# Patient Record
Sex: FEMALE | Race: WHITE | NOT HISPANIC OR LATINO | Employment: UNEMPLOYED | URBAN - METROPOLITAN AREA
[De-identification: names, ages, dates, MRNs, and addresses within clinical notes are randomized per-mention and may not be internally consistent; named-entity substitution may affect disease eponyms.]

---

## 2023-12-05 ENCOUNTER — OFFICE VISIT (OUTPATIENT)
Dept: NEUROSURGERY | Facility: CLINIC | Age: 43
End: 2023-12-05
Payer: COMMERCIAL

## 2023-12-05 VITALS
BODY MASS INDEX: 27.37 KG/M2 | HEART RATE: 79 BPM | WEIGHT: 184.8 LBS | DIASTOLIC BLOOD PRESSURE: 83 MMHG | SYSTOLIC BLOOD PRESSURE: 123 MMHG | HEIGHT: 69 IN

## 2023-12-05 DIAGNOSIS — M89.9 LESION OF LUMBAR SPINE: ICD-10-CM

## 2023-12-05 DIAGNOSIS — M47.817 SPONDYLOSIS OF LUMBOSACRAL REGION WITHOUT MYELOPATHY OR RADICULOPATHY: Primary | ICD-10-CM

## 2023-12-05 DIAGNOSIS — M47.816 FACET ARTHRITIS OF LUMBAR REGION: ICD-10-CM

## 2023-12-05 NOTE — PROGRESS NOTES
"Chief Complaint  Back Pain (Low back pain on right side.  Pt has tried PT would results)    Subjective          Anila Ward who is a 43 y.o. year old female who presents to [facility] for evaluation of lumbar spine.      The patient complains of pain located in the lumbar spine.  Patients states the pain has been present for several years.  The pain came on gradually.  Pain has progressed over the past 6 months. The pain scale level is 6 today. Will increase to a 9/10 when most severe.  The pain does not radiate. .  The pain is waxing/waning and described as aching and throbbing.  The pain is worse at no particular time of day. Patient states prolonged sitting and driving, repetitive bending and twisting makes the pain worse.  Patient states  repositioning  makes the pain better.    Associated Symptoms Include: Denies numbness and tingling  Conservative Interventions Include: Physical Therapy that was not very effective. She will occasionally take Motrin though not often. She has never been to pain management.    Was this the result of an injury or accident? : No    History of Previous Spinal Surgery?: No    Nicotine use: non-smoker    BMI: Body mass index is 27.29 kg/m².      Review of Systems   Musculoskeletal:  Positive for arthralgias, back pain and myalgias.   All other systems reviewed and are negative.       Objective   Vital Signs:   /83 (BP Location: Left arm)   Pulse 79   Ht 175.3 cm (69\")   Wt 83.8 kg (184 lb 12.8 oz)   BMI 27.29 kg/m²       Physical Exam  Vitals reviewed.   Constitutional:       Appearance: Normal appearance.   Musculoskeletal:      Lumbar back: Tenderness present. Negative right straight leg raise test and negative left straight leg raise test.      Right hip: No tenderness. Normal range of motion.      Left hip: No tenderness. Normal range of motion.   Neurological:      Mental Status: She is alert and oriented to person, place, " and time.      Motor: Motor strength is normal.     Gait: Gait is intact.      Deep Tendon Reflexes:      Reflex Scores:       Patellar reflexes are 2+ on the right side and 2+ on the left side.       Achilles reflexes are 2+ on the right side and 2+ on the left side.       Neurologic Exam     Mental Status   Oriented to person, place, and time.   Level of consciousness: alert    Motor Exam   Muscle bulk: normal  Overall muscle tone: normal    Strength   Strength 5/5 throughout.     Sensory Exam   Light touch normal.     Gait, Coordination, and Reflexes     Gait  Gait: normal    Reflexes   Right patellar: 2+  Left patellar: 2+  Right achilles: 2+  Left achilles: 2+  Right ankle clonus: absent  Left ankle clonus: absent       Result Review :       Data reviewed : Radiologic studies MRI Lumbar Spine on 10/31/23 at  personally reviewed. Modic type I endplate changes at L4/5 with a right sided disc osteophyte complex, resulting in mild spinal canal and moderate right foraminal stenosis. At L2/3 there is a 5x3 mm nodular focus slightly displacing the intrathecal neural structures, visible only on sagittal imaging. Could relate to a small meningioma or schwannoma of the nerve root sleeve. Contrasted imaging is recommended for further evaluation.           Assessment and Plan    Diagnoses and all orders for this visit:    1. Spondylosis of lumbosacral region without myelopathy or radiculopathy (Primary)  -     Ambulatory Referral to Pain Management Clinic    2. Facet arthritis of lumbar region  -     Ambulatory Referral to Pain Management Clinic    3. Lesion of lumbar spine  -     MRI Lumbar Spine With & Without Contrast; Future    Pt presenting for evaluation of back pain. We reviewed her MRI Lumbar Spine, demonstrating spondylosis with modic endplate changes at L4/5. Her pain is primarily in the back. We discussed that surgery would likely not provide improvement in back pain. She has failed physical therapy. Will  refer to pain management for lumbar RFA trial.     There was an incidental spinal lesion found at L3/4. Will order MRI Lumbar w/wo contrast to rule out metastatic process.     Follow up after MRI.      Follow Up   Return in about 6 weeks (around 1/16/2024).  Patient was given instructions and counseling regarding her condition or for health maintenance advice.     -MRI Lumbar Spine w/wo  -Follow up after MRI

## 2024-01-04 DIAGNOSIS — M89.9 LESION OF LUMBAR SPINE: ICD-10-CM

## 2024-01-09 ENCOUNTER — TELEPHONE (OUTPATIENT)
Dept: NEUROSURGERY | Facility: CLINIC | Age: 44
End: 2024-01-09
Payer: COMMERCIAL

## 2024-02-15 ENCOUNTER — OFFICE VISIT (OUTPATIENT)
Dept: NEUROSURGERY | Facility: CLINIC | Age: 44
End: 2024-02-15
Payer: COMMERCIAL

## 2024-02-15 VITALS
SYSTOLIC BLOOD PRESSURE: 124 MMHG | BODY MASS INDEX: 27.73 KG/M2 | WEIGHT: 187.2 LBS | HEIGHT: 69 IN | DIASTOLIC BLOOD PRESSURE: 76 MMHG

## 2024-02-15 DIAGNOSIS — M51.36 DDD (DEGENERATIVE DISC DISEASE), LUMBAR: Primary | ICD-10-CM

## 2024-02-15 DIAGNOSIS — D49.7 INTRADURAL EXTRAMEDULLARY SPINAL TUMOR: ICD-10-CM

## 2025-02-14 ENCOUNTER — HOSPITAL ENCOUNTER (OUTPATIENT)
Dept: MRI IMAGING | Facility: HOSPITAL | Age: 45
Discharge: HOME | End: 2025-02-14
Admitting: NEUROLOGICAL SURGERY
Payer: COMMERCIAL

## 2025-02-14 DIAGNOSIS — D49.7 INTRADURAL EXTRAMEDULLARY SPINAL TUMOR: ICD-10-CM

## 2025-02-14 PROCEDURE — 25510000002 GADOBENATE DIMEGLUMINE 529 MG/ML SOLUTION: Performed by: NEUROLOGICAL SURGERY

## 2025-02-14 PROCEDURE — A9577 INJ MULTIHANCE: HCPCS | Performed by: NEUROLOGICAL SURGERY

## 2025-02-14 PROCEDURE — 72158 MRI LUMBAR SPINE W/O & W/DYE: CPT

## 2025-02-14 RX ADMIN — GADOBENATE DIMEGLUMINE 18 ML: 529 INJECTION, SOLUTION INTRAVENOUS at 12:10

## 2025-02-18 ENCOUNTER — OFFICE VISIT (OUTPATIENT)
Dept: NEUROSURGERY | Facility: CLINIC | Age: 45
End: 2025-02-18
Payer: COMMERCIAL

## 2025-02-18 VITALS — DIASTOLIC BLOOD PRESSURE: 86 MMHG | SYSTOLIC BLOOD PRESSURE: 128 MMHG

## 2025-02-18 DIAGNOSIS — D49.7 INTRADURAL EXTRAMEDULLARY SPINAL TUMOR: Primary | ICD-10-CM

## 2025-02-18 DIAGNOSIS — M51.360 DEGENERATION OF INTERVERTEBRAL DISC OF LUMBAR REGION WITH DISCOGENIC BACK PAIN: ICD-10-CM

## 2025-02-18 PROCEDURE — 99214 OFFICE O/P EST MOD 30 MIN: CPT | Performed by: NEUROLOGICAL SURGERY

## 2025-02-18 NOTE — PROGRESS NOTES
Anila Ward is a 44 y.o. female that presents with L2 lesion       HPI    History of Present Illness  The patient is a 44-year-old male who presents for evaluation of back pain.    He reports persistent back pain, which remains unchanged despite undergoing nerve ablation and receiving steroid injections. He has not previously undergone an Intracept procedure. His pain management is currently overseen by [facility] in [place].       Review of Systems   Musculoskeletal:  Positive for back pain.        Vitals:    02/18/25 1004   BP: 128/86        Physical Exam  Constitutional:       Appearance: She is normal weight.   Neurological:      Mental Status: She is alert.      Sensory: No sensory deficit.      Motor: No weakness.   Psychiatric:         Mood and Affect: Mood normal.        I personally interpreted the patient's MRI scan with the patient.   Results  Imaging  MRI shows a small spot that looks identical to previous ones from 2020. There are Modic endplate changes observed above and below a narrowed disc. Degenerative disc disease at L4-5 was noted in 2023.        Assessment and Plan {CC Problem List  Visit Diagnosis  ROS  Review (Popup)  Health Maintenance  Quality  BestPractice  Medications  SmartSets  SnapShot Encounters  Media :23}   Problem List Items Addressed This Visit    None  Visit Diagnoses       Intradural extramedullary spinal tumor    -  Primary    Relevant Orders    MRI Lumbar Spine With & Without Contrast    Degeneration of intervertebral disc of lumbar region with discogenic back pain                Assessment & Plan  1. Back pain.  The patient's back pain is primarily attributed to degenerative disc disease, with Modic endplate changes observed above and below the narrowed disc. The presence of a small, bright white spot on the imaging, which appears identical to previous scans from 2020, is noted. This spot is not believed to be the source of his back  pain. The overall appearance of the spot remains consistent and very small. The bone above and below the disc appears abnormal, likely due to Modic endplate changes. The bone also exhibits varying shades, with some areas appearing darker and others lighter. The top of the hip aligns with the L4-5 region, which is where the pain is located. The pain is in the correct area, although it is not a precise pinpoint location. The patient is experiencing intradural extramedullary symptoms along with degenerative disease accompanied by Modic endplate changes. These Modic endplate changes are crucial as they qualify the patient for the Intracept procedure. On paper, the patient appears to be a suitable candidate for this procedure. The Intracept procedure, a relatively new intervention, was discussed as a potential treatment option. This procedure involves nerve ablation and has shown promising results in terms of duration of benefit. The alternative surgical option of lumbar fusion was also considered, but it is more invasive and requires a longer recovery period. It was recommended that he consult with his pain management specialist regarding the Intracept procedure. The plan is to monitor the spot for a longer period to ensure its stability. If it remains unchanged for about 5 years, it can be confidently assessed as non-threatening. Although it is not growing rapidly or posing any immediate danger, and it appears benign, it is important to monitor its size. If it enlarges, surgical removal may be considered. However, it is important to note that surgery would not alleviate the back pain, which is caused by degenerative disc disease. An MRI will be scheduled in a couple of years to monitor the spot, or sooner if any new symptoms, particularly those radiating into the legs, are observed.           Follow Up {Instructions Charge Capture  Follow-up Communications :23}   Return in about 105 weeks (around  2/23/2027).      Patient or patient representative verbalized consent for the use of Ambient Listening during the visit with  Pablo Roa MD for chart documentation. 2/18/2025  10:15 EST